# Patient Record
Sex: FEMALE | Race: WHITE | Employment: OTHER | URBAN - METROPOLITAN AREA
[De-identification: names, ages, dates, MRNs, and addresses within clinical notes are randomized per-mention and may not be internally consistent; named-entity substitution may affect disease eponyms.]

---

## 2018-02-09 ENCOUNTER — HOSPITAL ENCOUNTER (OUTPATIENT)
Dept: LAB | Age: 63
Discharge: HOME | End: 2018-02-09

## 2018-06-07 ENCOUNTER — OFFICE VISIT (OUTPATIENT)
Dept: INTERNAL MEDICINE CLINIC | Age: 63
End: 2018-06-07

## 2018-06-07 VITALS
OXYGEN SATURATION: 98 % | HEART RATE: 79 BPM | WEIGHT: 223 LBS | SYSTOLIC BLOOD PRESSURE: 158 MMHG | DIASTOLIC BLOOD PRESSURE: 88 MMHG | TEMPERATURE: 98.9 F | HEIGHT: 66 IN | BODY MASS INDEX: 35.84 KG/M2

## 2018-06-07 DIAGNOSIS — I10 ESSENTIAL HYPERTENSION: ICD-10-CM

## 2018-06-07 DIAGNOSIS — J01.00 ACUTE MAXILLARY SINUSITIS, RECURRENCE NOT SPECIFIED: Primary | ICD-10-CM

## 2018-06-07 PROBLEM — C50.411 MALIGNANT NEOPLASM OF UPPER-OUTER QUADRANT OF RIGHT FEMALE BREAST (HCC): Status: ACTIVE | Noted: 2018-06-07

## 2018-06-07 RX ORDER — BUMETANIDE 2 MG/1
2 TABLET ORAL DAILY
COMMUNITY

## 2018-06-07 RX ORDER — ATORVASTATIN CALCIUM 10 MG/1
TABLET, FILM COATED ORAL DAILY
COMMUNITY

## 2018-06-07 RX ORDER — ASPIRIN 81 MG/1
TABLET ORAL DAILY
COMMUNITY

## 2018-06-07 RX ORDER — SPIRONOLACTONE 25 MG/1
TABLET ORAL DAILY
COMMUNITY

## 2018-06-07 RX ORDER — CLONIDINE HYDROCHLORIDE 0.1 MG/1
TABLET ORAL 2 TIMES DAILY
COMMUNITY

## 2018-06-07 RX ORDER — AMOXICILLIN AND CLAVULANATE POTASSIUM 875; 125 MG/1; MG/1
1 TABLET, FILM COATED ORAL 2 TIMES DAILY
Qty: 20 TAB | Refills: 0 | Status: SHIPPED | OUTPATIENT
Start: 2018-06-07 | End: 2018-06-17

## 2018-06-07 RX ORDER — DOXAZOSIN 8 MG/1
8 TABLET ORAL DAILY
COMMUNITY

## 2018-06-07 RX ORDER — VALSARTAN 320 MG/1
TABLET ORAL DAILY
COMMUNITY
End: 2019-03-27 | Stop reason: ALTCHOICE

## 2018-06-07 RX ORDER — NEBIVOLOL 20 MG/1
TABLET ORAL DAILY
COMMUNITY

## 2018-06-07 NOTE — PROGRESS NOTES
Olena Gregory is a 70 y.o. female presenting for Cold Symptoms  . 1. Have you been to the ER, urgent care clinic since your last visit? Hospitalized since your last visit? No    2. Have you seen or consulted any other health care providers outside of the [facility] since your last visit? Include any pap smears or colon screening. No    Fall Risk Assessment, last 12 mths 6/7/2018   Able to walk? Yes   Fall in past 12 months? No         Abuse Screening Questionnaire 6/7/2018   Do you ever feel afraid of your partner? N   Are you in a relationship with someone who physically or mentally threatens you? N   Is it safe for you to go home? Y       PHQ over the last two weeks 6/7/2018   Little interest or pleasure in doing things Not at all   Feeling down, depressed or hopeless Not at all   Total Score PHQ 2 0       There are no discontinued medications.

## 2018-06-07 NOTE — MR AVS SNAPSHOT
[address] [place] [address] 70 [address] [address] [phone] Patient: Laureen Tiwari MRN: [id number] OBC:5/6/[birth year]63 Visit Information Date & Time Provider Department Dept. Phone Encounter #  
 6/7/2018  1:10 PM Ilene Calderón MD [facility] [id number] Follow-up Instructions Return if symptoms worsen or fail to improve. Upcoming Health Maintenance Date Due Hepatitis C Screening 1955 DTaP/Tdap/Td series (1 - Tdap) 6/5/1974 PAP AKA CERVICAL CYTOLOGY 6/5/1974 BREAST CANCER SCRN MAMMOGRAM 6/5/2003 FOBT Q 1 YEAR AGE 50-75 6/5/2003 ZOSTER VACCINE AGE 60> 4/5/2013 GLAUCOMA SCREENING Q2Y 6/5/2018 Bone Densitometry (Dexa) Screening 6/5/2018 Pneumococcal 65+ High/Highest Risk (1 of 2 - PCV13) 6/5/2018 Influenza Age 5 to Adult 8/1/2018 Allergies as of 6/7/2018  Review Complete On: 6/7/2018 By: Ilene Calderón MD  
  
 Severity Noted Reaction Type Reactions Tetracycline Medium 06/07/2018    Swelling Current Immunizations  Never Reviewed No immunizations on file. Not reviewed this visit You Were Diagnosed With   
  
 Codes Comments Acute maxillary sinusitis, recurrence not specified    -  Primary ICD-10-CM: J01.00 ICD-9-CM: 461.0 Essential hypertension     ICD-10-CM: I10 
ICD-9-CM: 401.9 Vitals BP Pulse Temp Height(growth percentile) Weight(growth percentile) SpO2  
 158/88 (BP 1 Location: Right arm, BP Patient Position: Sitting) 79 98.9 °F (37.2 °C) 5' 6\" (1.676 m) 223 lb (101.2 kg) 98% BMI Smoking Status 35.99 kg/m2 Never Smoker BMI and BSA Data Body Mass Index Body Surface Area 35.99 kg/m 2 2.17 m 2 Preferred Pharmacy Pharmacy Name Phone [id number] [address] [phone] Your Updated Medication List  
  
   
This list is accurate as of 6/7/18  1:46 PM.  Always use your most recent med list.  
  
  
  
  
 amoxicillin-clavulanate 875-125 mg per tablet Commonly known as:  AUGMENTIN Take 1 Tab by mouth two (2) times a day for 10 days. aspirin delayed-release 81 mg tablet Take  by mouth daily. bumetanide 2 mg tablet Commonly known as:  Sharlee Dhaliwal Take 2 mg by mouth daily. BYSTOLIC 20 mg tablet Generic drug:  nebivolol Take  by mouth daily. cloNIDine HCl 0.1 mg tablet Commonly known as:  CATAPRES Take  by mouth two (2) times a day. doxazosin 8 mg tablet Commonly known as:  CARDURA Take 8 mg by mouth daily. LIPITOR 10 mg tablet Generic drug:  atorvastatin Take  by mouth daily. spironolactone 25 mg tablet Commonly known as:  ALDACTONE Take  by mouth daily. valsartan 320 mg tablet Commonly known as:  DIOVAN Take  by mouth daily. Prescriptions Sent to Pharmacy Refills  
 amoxicillin-clavulanate (AUGMENTIN) 875-125 mg per tablet 0 Sig: Take 1 Tab by mouth two (2) times a day for 10 days. Class: Normal  
 Pharmacy: [address] #: [phone] Route: Oral  
  
Follow-up Instructions Return if symptoms worsen or fail to improve. Introducing [facility]! Mejia Blackmon introduces [employer] patient portal. Now you can access parts of your medical record, email your doctor's office, and request medication refills online. 1. In your internet browser, go to [email or web address] 2. Click on the First Time User? Click Here link in the Sign In box. You will see the New Member Sign Up page. 3. Enter your [employer] Access Code exactly as it appears below. You will not need to use this code after youve completed the sign-up process.  If you do not sign up before the expiration date, you must request a new code. · [employer] Access Code: [email or web address] Expires: 9/5/2018  1:22 PM 
 
4. Enter the last four digits of your Social Security Number (xxxx) and Date of Birth (mm/dd/yyyy) as indicated and click Submit. You will be taken to the next sign-up page. 5. Create a [employer] ID. This will be your [employer] login ID and cannot be changed, so think of one that is secure and easy to remember. 6. Create a [employer] password. You can change your password at any time. 7. Enter your Password Reset Question and Answer. This can be used at a later time if you forget your password. 8. Enter your e-mail address. You will receive e-mail notification when new information is available in [address] 9. Click Sign Up. You can now view and download portions of your medical record. 10. Click the Download Summary menu link to download a portable copy of your medical information. If you have questions, please visit the Frequently Asked Questions section of the [employer] website. Remember, [employer] is NOT to be used for urgent needs. For medical emergencies, dial 911. Now available from your iPhone and Android! Please provide this summary of care documentation to your next provider. If you have any questions after today's visit, please call [phone].

## 2018-06-07 NOTE — PROGRESS NOTES
This note will not be viewable in [address] Stacey Johnson is a 70 y.o. female and presents with Cold Symptoms  . Subjective:  Mrs. Bernardine Libman presents to the office today with 10 days worth of an upper respiratory infection with the development of sinus congestion, maxillary discomfort and purulent sinus drainage. She has had postnasal drainage which is been causing a cough productive of yellowish phlegm. She has had some feverishness without chills she denies neck stiffness or rash. She has been taking over-the-counter cold medication and believes this may be causing her blood pressure to be elevated    Patient Active Problem List   Diagnosis Code    Essential hypertension I10    Malignant neoplasm of upper-outer quadrant of right female breast ([facility] [address] C50.411     History reviewed. No pertinent surgical history. Allergies   Allergen Reactions    Tetracycline Swelling     Current Outpatient Prescriptions   Medication Sig Dispense Refill    cloNIDine HCl (CATAPRES) 0.1 mg tablet Take  by mouth two (2) times a day.  nebivolol (BYSTOLIC) 20 mg tablet Take  by mouth daily.  doxazosin (CARDURA) 8 mg tablet Take 8 mg by mouth daily.  valsartan (DIOVAN) 320 mg tablet Take  by mouth daily.  bumetanide (BUMEX) 2 mg tablet Take 2 mg by mouth daily.  spironolactone (ALDACTONE) 25 mg tablet Take  by mouth daily.  aspirin delayed-release 81 mg tablet Take  by mouth daily.  atorvastatin (LIPITOR) 10 mg tablet Take  by mouth daily.  amoxicillin-clavulanate (AUGMENTIN) 875-125 mg per tablet Take 1 Tab by mouth two (2) times a day for 10 days.  20 Tab 0     Social History     Social History    Marital status:      Spouse name: N/A    Number of children: N/A    Years of education: N/A     Social History Main Topics    Smoking status: Never Smoker    Smokeless tobacco: Never Used    Alcohol use Yes      Comment: rare    Drug use: No    Sexual activity: Not Asked     Other Topics Concern    None     Social History Narrative    None     Family History   Problem Relation Age of Onset    Pneumonia Father     Lung Disease Father        Review of Systems  Constitutional: negative for fevers, chills, anorexia and weight loss  Eyes:   negative for visual disturbance and irritation  ENT:   Positive for sinus congestion, maxillary discomfort, purulent psotnasal draingage and throat irritation  Respiratory:  negative for cough, hemoptysis, dyspnea,wheezing  CV:   negative for chest pain, palpitations, lower extremity edema  GI:   negative for nausea, vomiting, diarrhea, abdominal pain,melena  Integumentary: negative for rash and pruritus  Neurological:  negative for headaches, dizziness, vertigo, memory problems and gait       Objective:  Visit Vitals    /88 (BP 1 Location: Right arm, BP Patient Position: Sitting)    Pulse 79    Temp 98.9 °F (37.2 °C)    Ht 5' 6\" (1.676 m)    Wt 223 lb (101.2 kg)    SpO2 98%    BMI 35.99 kg/m2     Body mass index is 35.99 kg/(m^2). Physical Exam:   General appearance - alert, well appearing, and in no distress  Mental status - alert, oriented to person, place, and time  EYE-ODELL, EOMI, sclera clear. No lid swelling or purulent drainage  ENT- TM's clear without A/F level. Pharynx slightly erythematous with drainage noted  Nose - normal and patent, no erythema,  Neck - supple, no significant adenopathy   Chest - clear to auscultation, no wheezes, rales or rhonchi, symmetric air entry   Heart - normal rate, regular rhythm, normal S1, S2, no murmurs, rubs, clicks or gallops   Skin-No rash appreciated  Neuro -alert, oriented, normal speech, no focal findings. Assessment/Plan:  Diagnoses and all orders for this visit:    Acute maxillary sinusitis, recurrence not specified  -     amoxicillin-clavulanate (AUGMENTIN) 875-125 mg per tablet; Take 1 Tab by mouth two (2) times a day for 10 days. , Normal, Disp-20 Tab, R-0    Essential hypertension        Other Instructions:  Mucinex as directed    Delsym as needed for cough    Blood pressure probably elevated as a result of cold medication she has been taking    Body mass index is 36 and dietary counseling along with printed patient education is given    Increase po fluids    Follow-up Disposition:  Return if symptoms worsen or fail to improve. I have reviewed with the patient details of the assessment and plan and all questions were answered. Relevent patient education was performed. An After Visit Summary was printed and given to the patient.     Karen Diaz MD

## 2018-10-23 ENCOUNTER — CLINICAL SUPPORT (OUTPATIENT)
Dept: INTERNAL MEDICINE CLINIC | Age: 63
End: 2018-10-23

## 2018-10-23 VITALS
HEART RATE: 78 BPM | TEMPERATURE: 98.2 F | RESPIRATION RATE: 16 BRPM | DIASTOLIC BLOOD PRESSURE: 92 MMHG | SYSTOLIC BLOOD PRESSURE: 160 MMHG | OXYGEN SATURATION: 98 %

## 2018-10-23 DIAGNOSIS — Z23 ENCOUNTER FOR IMMUNIZATION: Primary | ICD-10-CM

## 2019-03-27 ENCOUNTER — OFFICE VISIT (OUTPATIENT)
Dept: INTERNAL MEDICINE CLINIC | Age: 64
End: 2019-03-27

## 2019-03-27 ENCOUNTER — TELEPHONE (OUTPATIENT)
Dept: INTERNAL MEDICINE CLINIC | Age: 64
End: 2019-03-27

## 2019-03-27 VITALS
HEART RATE: 71 BPM | HEIGHT: 66 IN | WEIGHT: 218 LBS | DIASTOLIC BLOOD PRESSURE: 80 MMHG | SYSTOLIC BLOOD PRESSURE: 130 MMHG | RESPIRATION RATE: 20 BRPM | BODY MASS INDEX: 35.03 KG/M2 | TEMPERATURE: 98.5 F | OXYGEN SATURATION: 99 %

## 2019-03-27 DIAGNOSIS — M79.671 RIGHT FOOT PAIN: Primary | ICD-10-CM

## 2019-03-27 RX ORDER — OLMESARTAN MEDOXOMIL 40 MG/1
TABLET ORAL
COMMUNITY
Start: 2019-03-04

## 2019-03-27 RX ORDER — DICLOFENAC SODIUM 75 MG/1
75 TABLET, DELAYED RELEASE ORAL 2 TIMES DAILY
Qty: 60 TAB | Refills: 0 | Status: SHIPPED | OUTPATIENT
Start: 2019-03-27

## 2019-03-27 NOTE — PROGRESS NOTES
This note will not be viewable in [address] Subjective:  
 
Mrs. Harris Licona presents to the office today for the evaluation of an injury which occurred to her right foot and ankle 1 week ago. The patient states that she thought that she had injured it while walking and may have turned her ankle. A day or 2 later she went to a house with them on the even front sidewalk and turned again. She noted severe pain over the last several days mostly along the lateral aspect of the right ankle and foot region. There has been some swelling but no ecchymosis. She has been able to bear weight. She noted that the pain was more severe yesterday and she started taking Aleve and is noted some improvement in the discomfort. She denies any pain or swelling extending up her leg. Past Medical History:  
Diagnosis Date  Essential hypertension 6/7/2018  Malignant neoplasm of upper-outer quadrant of right female breast ([facility] [address] 6/7/2018 History reviewed. No pertinent surgical history. Allergies Allergen Reactions  Tetracycline Swelling Current Outpatient Medications Medication Sig Dispense Refill  olmesartan (BENICAR) 40 mg tablet  cloNIDine HCl (CATAPRES) 0.1 mg tablet Take  by mouth two (2) times a day.  nebivolol (BYSTOLIC) 20 mg tablet Take  by mouth daily.  doxazosin (CARDURA) 8 mg tablet Take 8 mg by mouth daily.  bumetanide (BUMEX) 2 mg tablet Take 2 mg by mouth daily.  spironolactone (ALDACTONE) 25 mg tablet Take  by mouth daily.  aspirin delayed-release 81 mg tablet Take  by mouth daily.  atorvastatin (LIPITOR) 10 mg tablet Take  by mouth daily. Social History Socioeconomic History  Marital status:  Spouse name: Not on file  Number of children: Not on file  Years of education: Not on file  Highest education level: Not on file Tobacco Use  Smoking status: Never Smoker  Smokeless tobacco: Never Used Substance and Sexual Activity  Alcohol use: Yes Comment: rare  Drug use: No  
 
Family History Problem Relation Age of Onset  Pneumonia Father  Lung Disease Father Review of Systems: 
GEN: no weight loss, weight gain, fatigue or night sweats CV: no PND, orthopnea, or palpitations Resp: no dyspnea on exertion, no cough Abd: no nausea, vomiting or diarrhea EXT: Positive for right foot/ankle pain and swelling Endocrine: no hair loss, excessive thirst or polyuria Neurological ROS: no TIA or stroke symptoms ROS otherwise negative Objective:  
 
Visit Vitals /80 (BP 1 Location: Right arm, BP Patient Position: Sitting) Pulse 71 Temp 98.5 °F (36.9 °C) (Oral) Resp 20 Ht 5' 6\" (1.676 m) Wt 218 lb (98.9 kg) SpO2 99% BMI 35.19 kg/m² Body mass index is 35.19 kg/m². General:   alert, cooperative and no distress Extremities:  Examination of the right foot reveals no obvious swelling, redness or warmth. Examination of the right ankle reveals a full range of motion however there is swelling along the lateral ankle joint. She has pain with palpation over the tendons in that area. There is no pain along the bottom of the foot or along the heel and no pain in the Achilles tendon region. Her PT/DP pulses were intact Neuro: ..alert, oriented x3,speech normal in context and clarity, cranial nerves II-XII intact,motor strength: full proximally and distally,gait: normal 
reflexes: full and symmetric Physical exam otherwise negative Assessment/Plan:  
 
Diagnoses and all orders for this visit: 
 
Right foot pain -     XR ANKLE RT MIN 3 V; Future Other instructions:  
Review of the right ankle x-rays fails to reveal any fracture. I suspect she has an inversion sprain of the right ankle. Start warm soaks with range of motion exercises Have recommended a supportive shoe to be worn at all times Voltaren has been prescribed for pain and inflammation Follow-up if there is any worsening Ilene Calderón MD

## 2019-03-27 NOTE — PROGRESS NOTES
Gilberto Mcclendon is a 70 y.o. female presenting for Foot Injury (R) Aldenguerline Kirby 1. Have you been to the ER, urgent care clinic since your last visit? Hospitalized since your last visit? No 
 
2. Have you seen or consulted any other health care providers outside of the [address] [place] [address] since your last visit? Include any pap smears or colon screening. Cardiologist. 
 
Fall Risk Assessment, last 12 mths 3/27/2019 Able to walk? Yes Fall in past 12 months? No  
 
 
 
Abuse Screening Questionnaire 3/27/2019 Do you ever feel afraid of your partner? Genaro Bile Are you in a relationship with someone who physically or mentally threatens you? Genaro Bile Is it safe for you to go home? Y  
 
 
3 most recent PHQ Screens 3/27/2019 Little interest or pleasure in doing things Not at all Feeling down, depressed, irritable, or hopeless Not at all Total Score PHQ 2 0 There are no discontinued medications.

## 2019-03-27 NOTE — TELEPHONE ENCOUNTER
Patient is calling, she is requesting a call back from Geraldo López in regards to the appointment she has today at 1pm. She would like to discuss her foot and wants to know if she still needs this appointment.     Best call back # for patient: [phone]

## 2019-03-27 NOTE — PATIENT INSTRUCTIONS
Ankle Sprain: Rehab Exercises Your Care Instructions Here are some examples of typical rehabilitation exercises for your condition. Start each exercise slowly. Ease off the exercise if you start to have pain. Your doctor or physical therapist will tell you when you can start these exercises and which ones will work best for you. How to do the exercises \"Alphabet\" exercise 1. Trace the alphabet with your toe. This helps your ankle move in all directions. Side-to-side knee swing exercise 1. Sit in a chair with your foot flat on the floor. 2. Slowly move your knee from side to side. Keep your foot pressed flat. 3. Continue this exercise for 2 to 3 minutes. Towel curl 1. While sitting, place your foot on a towel on the floor. Scrunch the towel toward you with your toes. 2. Then use your toes to push the towel away from you. 3. To make this exercise more challenging you can put something on the other end of the towel. A can of soup is about the right weight for this. Towel stretch 1. Sit with your legs extended and knees straight. 2. Place a towel around your foot just under the toes. 3. Hold each end of the towel in each hand, with your hands above your knees. 4. Pull back with the towel so that your foot stretches toward you. 5. Hold the position for at least 15 to 30 seconds. 6. Repeat 2 to 4 times a session. Do up to 5 sessions a day. Ankle eversion exercise 1. Start by sitting with your foot flat on the floor. Push your foot outward against a wall or a piece of furniture that doesn't move. Hold for about 6 seconds, and relax. Repeat 8 to 12 times. 2. After you feel comfortable with this, try using rubber tubing looped around the outside of your feet for resistance. Push your foot out to the side against the tubing, and then count to 10 as you slowly bring your foot back to the middle. Repeat 8 to 12 times. Isometric opposition exercises 1. While sitting, put your feet together flat on the floor. 2. Press your injured foot inward against your other foot. Hold for about 6 seconds, and relax. Repeat 8 to 12 times. 3. Then place the heel of your other foot on top of the injured one. Push down with the top heel while trying to push up with your injured foot. Hold for about 6 seconds, and relax. Repeat 8 to 12 times. Resisted ankle inversion 1. Sit on the floor with your good leg crossed over your other leg. 2. Hold both ends of an exercise band and loop the band around the inside of your affected foot. Then press your other foot against the band. 3. Keeping your legs crossed, slowly push your affected foot against the band so that foot moves away from your other foot. Then slowly relax. 4. Repeat 8 to 12 times. Resisted ankle eversion 1. Sit on the floor with your legs straight. 2. Hold both ends of an exercise band and loop the band around the outside of your affected foot. Then press your other foot against the band. 3. Keeping your leg straight, slowly push your affected foot outward against the band and away from your other foot without letting your leg rotate. Then slowly relax. 4. Repeat 8 to 12 times. Resisted ankle dorsiflexion 1. Tie the ends of an exercise band together to form a loop. Attach one end of the loop to a secure object or shut a door on it to hold it in place. (Or you can have someone hold one end of the loop to provide resistance.) 2. While sitting on the floor or in a chair, loop the other end of the band over the top of your affected foot. 3. Keeping your knee and leg straight, slowly flex your foot to pull back on the exercise band, and then slowly relax. 4. Repeat 8 to 12 times. Single-leg balance 1. Stand on a flat surface with your arms stretched out to your sides like you are making the letter \"T. \" Then lift your good leg off the floor, bending it at the knee. If you are not steady on your feet, use one hand to hold on to a chair, counter, or wall. 2. Standing on the leg with your affected ankle, keep that knee straight. Try to balance on that leg for up to 30 seconds. Then rest for up to 10 seconds. 3. Repeat 6 to 8 times. 4. When you can balance on your affected leg for 30 seconds with your eyes open, try to balance on it with your eyes closed. 5. When you can do this exercise with your eyes closed for 30 seconds and with ease and no pain, try standing on a pillow or piece of foam, and repeat steps 1 through 4. Follow-up care is a key part of your treatment and safety. Be sure to make and go to all appointments, and call your doctor if you are having problems. It's also a good idea to know your test results and keep a list of the medicines you take. Where can you learn more? Go to http://laura-elle.info/. Mally Cruz in the search box to learn more about \"Ankle Sprain: Rehab Exercises. \" Current as of: September 20, 2018 Content Version: 11.9 © 8369-9277 [employer]. Care instructions adapted under license by [employer] (which disclaims liability or warranty for this information). If you have questions about a medical condition or this instruction, always ask your healthcare professional. [place] [address] any warranty or liability for your use of this information.

## 2021-03-26 NOTE — PATIENT INSTRUCTIONS
High Blood Pressure: Care Instructions  Your Care Instructions    If your blood pressure is usually above 140/90, you have high blood pressure, or hypertension. That means the top number is 140 or higher or the bottom number is 90 or higher, or both. Despite what a lot of people think, high blood pressure usually doesn't cause headaches or make you feel dizzy or lightheaded. It usually has no symptoms. But it does increase your risk for heart attack, stroke, and kidney or eye damage. The higher your blood pressure, the more your risk increases. Your doctor will give you a goal for your blood pressure. Your goal will be based on your health and your age. An example of a goal is to keep your blood pressure below 140/90. Lifestyle changes, such as eating healthy and being active, are always important to help lower blood pressure. You might also take medicine to reach your blood pressure goal.  Follow-up care is a key part of your treatment and safety. Be sure to make and go to all appointments, and call your doctor if you are having problems. It's also a good idea to know your test results and keep a list of the medicines you take. How can you care for yourself at home? Medical treatment  · If you stop taking your medicine, your blood pressure will go back up. You may take one or more types of medicine to lower your blood pressure. Be safe with medicines. Take your medicine exactly as prescribed. Call your doctor if you think you are having a problem with your medicine. · Talk to your doctor before you start taking aspirin every day. Aspirin can help certain people lower their risk of a heart attack or stroke. But taking aspirin isn't right for everyone, because it can cause serious bleeding. · See your doctor regularly. You may need to see the doctor more often at first or until your blood pressure comes down.   · If you are taking blood pressure medicine, talk to your doctor before you take decongestants or anti-inflammatory medicine, such as ibuprofen. Some of these medicines can raise blood pressure. · Learn how to check your blood pressure at home. Lifestyle changes  · Stay at a healthy weight. This is especially important if you put on weight around the waist. Losing even 10 pounds can help you lower your blood pressure. · If your doctor recommends it, get more exercise. Walking is a good choice. Bit by bit, increase the amount you walk every day. Try for at least 30 minutes on most days of the week. You also may want to swim, bike, or do other activities. · Avoid or limit alcohol. Talk to your doctor about whether you can drink any alcohol. · Try to limit how much sodium you eat to less than 2,300 milligrams (mg) a day. Your doctor may ask you to try to eat less than 1,500 mg a day. · Eat plenty of fruits (such as bananas and oranges), vegetables, legumes, whole grains, and low-fat dairy products. · Lower the amount of saturated fat in your diet. Saturated fat is found in animal products such as milk, cheese, and meat. Limiting these foods may help you lose weight and also lower your risk for heart disease. · Do not smoke. Smoking increases your risk for heart attack and stroke. If you need help quitting, talk to your doctor about stop-smoking programs and medicines. These can increase your chances of quitting for good. When should you call for help? Call 911 anytime you think you may need emergency care. This may mean having symptoms that suggest that your blood pressure is causing a serious heart or blood vessel problem. Your blood pressure may be over 180/110. ? For example, call 911 if:  ? · You have symptoms of a heart attack. These may include:  ¨ Chest pain or pressure, or a strange feeling in the chest.  ¨ Sweating. ¨ Shortness of breath. ¨ Nausea or vomiting.   ¨ Pain, pressure, or a strange feeling in the back, neck, jaw, or upper belly or in one or both shoulders or arms.  ¨ Lightheadedness or sudden weakness. ¨ A fast or irregular heartbeat. ? · You have symptoms of a stroke. These may include:  ¨ Sudden numbness, tingling, weakness, or loss of movement in your face, arm, or leg, especially on only one side of your body. ¨ Sudden vision changes. ¨ Sudden trouble speaking. ¨ Sudden confusion or trouble understanding simple statements. ¨ Sudden problems with walking or balance. ¨ A sudden, severe headache that is different from past headaches. ? · You have severe back or belly pain. ?Do not wait until your blood pressure comes down on its own. Get help right away. ?Call your doctor now or seek immediate care if:  ? · Your blood pressure is much higher than normal (such as 180/110 or higher), but you don't have symptoms. ? · You think high blood pressure is causing symptoms, such as:  ¨ Severe headache. ¨ Blurry vision. ? Watch closely for changes in your health, and be sure to contact your doctor if:  ? · Your blood pressure measures 140/90 or higher at least 2 times. That means the top number is 140 or higher or the bottom number is 90 or higher, or both. ? · You think you may be having side effects from your blood pressure medicine. ? · Your blood pressure is usually normal, but it goes above normal at least 2 times. Where can you learn more? Go to http://laura-elle.info/. Enter B038 in the search box to learn more about \"High Blood Pressure: Care Instructions. \"  Current as of: September 21, 2016  Content Version: 11.4  © 7383-9015 [employer]. Care instructions adapted under license by [employer] (which disclaims liability or warranty for this information). If you have questions about a medical condition or this instruction, always ask your healthcare professional. [place] [address] any warranty or liability for your use of this information.        Learning About Cutting Calories  How do calories affect your weight? Food gives your body energy. Energy from the food you eat is measured in calories. This energy keeps your heart beating, your brain active, and your muscles working. Your body needs a certain number of calories each day. After your body uses the calories it needs, it stores extra calories as fat. To lose weight safely, you have to eat fewer calories while eating in a healthy way. How many calories do you need each day? The more active you are, the more calories you need. When you are less active, you need fewer calories. How many calories you need each day also depends on several things, including your age and whether you are male or female. Here are some general guidelines for adults:  · Less active women and older adults need 1,600 to 2,000 calories each day. · Active women and less active men need 2,000 to 2,400 calories each day. · Active men need 2,400 to 3,000 calories each day. How can you cut calories and eat healthy meals? Whole grains, vegetables and fruits, and dried beans are good lower-calorie foods. They give you lots of nutrients and fiber. And they fill you up. Sweets, energy drinks, and soda pop are high in calories. They give you few nutrients and no fiber. Try to limit soda pop, fruit juice, and energy drinks. Drink water instead. Some fats can be part of a healthy diet. But cutting back on fats from highly processed foods like fast foods and many snack foods is a good way to lower the calories in your diet. Also, use smaller amounts of fats like butter, margarine, salad dressing, and mayonnaise. Add fresh garlic, lemon, or herbs to your meals to add flavor without adding fat. Meats and dairy products can be a big source of hidden fats. Try to choose lean or low-fat versions of these products. Fat-free cookies, candies, chips, and frozen treats can still be high in sugar and calories. Some fat-free foods have more calories than regular ones.  Eat fat-free treats in moderation, as you would other foods. If your favorite foods are high in fat, salt, sugar, or calories, limit how often you eat them. Eat smaller servings, or look for healthy substitutes. Fill up on fruits, vegetables, and whole grains. Eating at home  · Use meat as a side dish instead of as the main part of your meal.  · Try main dishes that use whole wheat pasta, brown rice, dried beans, or vegetables. · Find ways to cook with little or no fat, such as broiling, steaming, or grilling. · Use cooking spray instead of oil. If you use oil, use a monounsaturated oil, such as canola or olive oil. · Trim fat from meats before you cook them. · Drain off fat after you brown the meat or while you roast it. · Chill soups and stews after you cook them. Then skim the fat off the top after it hardens. Eating out  · Order foods that are broiled or poached rather than fried or breaded. · Cut back on the amount of butter or margarine that you use on bread. · Order sauces, gravies, and salad dressings on the side, and use only a little. · When you order pasta, choose tomato sauce rather than cream sauce. · Ask for salsa with your baked potato instead of sour cream, butter, cheese, or constantino. · Order meals in a small size instead of upgrading to a large. · Share an entree, or take part of your food home to eat as another meal.  · Share appetizers and desserts. Where can you learn more? Go to http://laura-elle.info/. Enter 99 [id number] in the search box to learn more about \"Learning About Cutting Calories. \"  Current as of: May 12, 2017  Content Version: 11.4  © 0238-8122 [employer]. Care instructions adapted under license by [employer] (which disclaims liability or warranty for this information).  If you have questions about a medical condition or this instruction, always ask your healthcare professional. [address]  any warranty or liability for your use of this information. Chonodrocutaneous Helical Advancement Flap Text: The defect edges were debeveled with a #15 scalpel blade.  Given the location of the defect and the proximity to free margins a chondrocutaneous helical advancement flap was deemed most appropriate.  Using a sterile surgical marker, the appropriate advancement flap was drawn incorporating the defect and placing the expected incisions within the relaxed skin tension lines where possible.    The area thus outlined was incised deep to adipose tissue with a #15 scalpel blade.  The skin margins were undermined to an appropriate distance in all directions utilizing iris scissors.

## 2021-07-06 ENCOUNTER — TELEPHONE (OUTPATIENT)
Dept: INTERNAL MEDICINE CLINIC | Age: 66
End: 2021-07-06

## 2021-07-06 NOTE — TELEPHONE ENCOUNTER
Patient states she went to the [facility] clinic last Wednesday. She was watering flowers and a needle went into her finger. They prescribed Keflex and she has 4 more days of it but they told her to call after 3 days if it not better. It is still red and swollen. She has another appt at 11am so she is unavailable then.     Nando Regalado [phone]

## 2021-07-14 ENCOUNTER — HOSPITAL ENCOUNTER (INPATIENT)
Age: 66
LOS: 1 days | Discharge: HOME | DRG: 513 | End: 2021-07-14
Attending: ORTHOPAEDIC SURGERY | Admitting: ORTHOPAEDIC SURGERY
Payer: MEDICARE

## 2021-07-14 VITALS
RESPIRATION RATE: 16 BRPM | HEIGHT: 66 IN | SYSTOLIC BLOOD PRESSURE: 95 MMHG | DIASTOLIC BLOOD PRESSURE: 53 MMHG | TEMPERATURE: 98 F | OXYGEN SATURATION: 98 % | BODY MASS INDEX: 33.75 KG/M2 | HEART RATE: 67 BPM | WEIGHT: 210 LBS

## 2021-07-14 DIAGNOSIS — M10.041 ACUTE IDIOPATHIC GOUT OF RIGHT HAND: Primary | ICD-10-CM

## 2021-07-14 PROBLEM — M00.9 SEPTIC ARTHRITIS OF INTERPHALANGEAL JOINT OF FINGER, RIGHT (HCC): Status: ACTIVE | Noted: 2021-07-14

## 2021-07-14 LAB — HGB BLD-MCNC: 12 G/DL (ref 11.5–16)

## 2021-07-14 PROCEDURE — 87075 CULTR BACTERIA EXCEPT BLOOD: CPT

## 2021-07-14 PROCEDURE — 77030040922 HC BLNKT HYPOTHRM STRY -A

## 2021-07-14 PROCEDURE — 76210000050 HC AMBSU PH II REC 0.5 TO 1 HR: Performed by: ORTHOPAEDIC SURGERY

## 2021-07-14 PROCEDURE — 85018 HEMOGLOBIN: CPT

## 2021-07-14 PROCEDURE — 74011250636 HC RX REV CODE- 250/636: Performed by: ORTHOPAEDIC SURGERY

## 2021-07-14 PROCEDURE — 77030002916 HC SUT ETHLN J&J -A: Performed by: ORTHOPAEDIC SURGERY

## 2021-07-14 PROCEDURE — 88304 TISSUE EXAM BY PATHOLOGIST: CPT

## 2021-07-14 PROCEDURE — 0RBW0ZZ EXCISION OF RIGHT FINGER PHALANGEAL JOINT, OPEN APPROACH: ICD-10-PCS | Performed by: ORTHOPAEDIC SURGERY

## 2021-07-14 PROCEDURE — 76030000002 HC AMB SURG OR TIME FIRST 0.: Performed by: ORTHOPAEDIC SURGERY

## 2021-07-14 PROCEDURE — 87102 FUNGUS ISOLATION CULTURE: CPT

## 2021-07-14 PROCEDURE — 65270000029 HC RM PRIVATE

## 2021-07-14 PROCEDURE — 74011000250 HC RX REV CODE- 250: Performed by: ORTHOPAEDIC SURGERY

## 2021-07-14 PROCEDURE — 87205 SMEAR GRAM STAIN: CPT

## 2021-07-14 PROCEDURE — 74011250636 HC RX REV CODE- 250/636: Performed by: ANESTHESIOLOGY

## 2021-07-14 PROCEDURE — 2709999900 HC NON-CHARGEABLE SUPPLY: Performed by: ORTHOPAEDIC SURGERY

## 2021-07-14 PROCEDURE — 77030040356 HC CORD BPLR FRCP COVD -A: Performed by: ORTHOPAEDIC SURGERY

## 2021-07-14 PROCEDURE — 2709999900 HC NON-CHARGEABLE SUPPLY

## 2021-07-14 PROCEDURE — 87116 MYCOBACTERIA CULTURE: CPT

## 2021-07-14 RX ORDER — HYDROCODONE BITARTRATE AND ACETAMINOPHEN 5; 325 MG/1; MG/1
1 TABLET ORAL
Qty: 15 TABLET | Refills: 0 | Status: SHIPPED | OUTPATIENT
Start: 2021-07-14 | End: 2021-07-17

## 2021-07-14 RX ORDER — LIDOCAINE HYDROCHLORIDE 10 MG/ML
INJECTION INFILTRATION; PERINEURAL AS NEEDED
Status: DISCONTINUED | OUTPATIENT
Start: 2021-07-14 | End: 2021-07-14 | Stop reason: HOSPADM

## 2021-07-14 RX ORDER — SODIUM CHLORIDE, SODIUM LACTATE, POTASSIUM CHLORIDE, CALCIUM CHLORIDE 600; 310; 30; 20 MG/100ML; MG/100ML; MG/100ML; MG/100ML
25 INJECTION, SOLUTION INTRAVENOUS CONTINUOUS
Status: DISCONTINUED | OUTPATIENT
Start: 2021-07-14 | End: 2021-07-14 | Stop reason: HOSPADM

## 2021-07-14 RX ADMIN — CEFAZOLIN 2 G: 1 INJECTION, POWDER, FOR SOLUTION INTRAMUSCULAR; INTRAVENOUS at 11:39

## 2021-07-14 RX ADMIN — SODIUM CHLORIDE, POTASSIUM CHLORIDE, SODIUM LACTATE AND CALCIUM CHLORIDE 25 ML/HR: 600; 310; 30; 20 INJECTION, SOLUTION INTRAVENOUS at 10:20

## 2021-07-14 NOTE — H&P
CARE TEAM:  Patient Care Team:  Pedro Strong MD as PCP - General (Internal Medicine)    ASSESSMENT:  1. Septic arthritis of IP joint of finger, right   2. 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 Finger osteomyelitis, right       There is no problem list on file for this patient. PLAN:  Treatment Plan: She has radiographic findings concerning for chronic septic arthritis with surrounding osteomyelitis. I recommended surgical debridement. This can be done under local anesthesia. She will need Infectious Disease consult and likely long-term IV antibiotics. Orders Placed This Encounter    Surgical Posting Sheet    X-ray finger right 2+ views (23780)    BMI >=25 PATIENT INSTRUCTIONS & EDUCATION     Follow-up: No follow-ups on file. HISTORY OF PRESENT ILLNESS:  Chief Complaint: Injury of the Right Hand    Age: 74 y.o. Sex: female   History of present illness: Charlie Connors is a pleasant 74 y.o. female who presents today for evaluation of her right little finger. She reports an injury to the finger 2 and half weeks ago. She was gripping nail Polish off of her fingers. She had an injury. She was then trimming some bushes. She had a thorn stuck in her nail. She developed an infection. She has been on several rounds of oral antibiotics without complete improvement in her symptoms. She reports progressive deformity of the digit. No fevers or chills. Some pain. Past medical history, past surgical history, medications, allergies, social history, and review of systems have been reviewed by me. No current facility-administered medications on file prior to encounter. Current Outpatient Medications on File Prior to Encounter   Medication Sig Dispense Refill    olmesartan (BENICAR) 40 mg tablet       diclofenac EC (VOLTAREN) 75 mg EC tablet Take 1 Tab by mouth two (2) times a day. 60 Tab 0    cloNIDine HCl (CATAPRES) 0.1 mg tablet Take  by mouth two (2) times a day.       nebivolol (BYSTOLIC) 20 mg tablet Take  by mouth daily.  doxazosin (CARDURA) 8 mg tablet Take 8 mg by mouth daily.  bumetanide (BUMEX) 2 mg tablet Take 2 mg by mouth daily.  spironolactone (ALDACTONE) 25 mg tablet Take  by mouth daily.  aspirin delayed-release 81 mg tablet Take  by mouth daily.  atorvastatin (LIPITOR) 10 mg tablet Take  by mouth daily. Past Medical History:   Diagnosis Date    Essential hypertension 6/7/2018    Malignant neoplasm of upper-outer quadrant of right female breast ([address] 6/7/2018       Allergies   Allergen Reactions    Tetracycline Swelling     Social History     Socioeconomic History    Marital status:      Spouse name: Not on file    Number of children: Not on file    Years of education: Not on file    Highest education level: Not on file   Occupational History    Not on file   Tobacco Use    Smoking status: Never Smoker    Smokeless tobacco: Never Used   Substance and Sexual Activity    Alcohol use: Yes     Comment: rare    Drug use: No    Sexual activity: Not on file   Other Topics Concern    Not on file   Social History Narrative    Not on file     Social Determinants of Health     Financial Resource Strain:     Difficulty of Paying Living Expenses:    Food Insecurity:     Worried About Running Out of Food in the Last Year:     [address] in the Last Year:    Transportation Needs:     Lack of Transportation (Medical):      Lack of Transportation (Non-Medical):    Physical Activity:     Days of Exercise per Week:     Minutes of Exercise per Session:    Stress:     Feeling of Stress :    Social Connections:     Frequency of Communication with Friends and Family:     Frequency of Social Gatherings with Friends and Family:     Attends [employer] Services:     Active Member of Clubs or Organizations:     Attends Club or Organization Meetings:     Marital Status:    Intimate Partner Violence:     Fear of Current or Ex-Partner:  Emotionally Abused:     Physically Abused:     Sexually Abused:          Review of Systems   7/13/2021    Constitutional: Unexplained: Negative  Genitourinary: Frequent Urination: Negative  HEENT: Vision Loss: Negative  Neurological: Memory Loss: Negative  Integumentary: Rash: Negative  Cardiovascular: Palpatations: Negative  Hematologic: Bruises/Bleeds Easily: Negative  Gastrointestinal: Constipation: Negative  Immunological: Seasonal Allergies: Negative  Musculoskeletal: Joint Pain: Negative    OBJECTIVE:  Constitutional: No acute distress. Pleasant and cooperative with exam.  HEENT: Normocephalic. Atraumatic. Eyes are clear  Hearing intact to spoken word   Respiratory: Breathing unlabored. .  Cardiovascular: No marked edema. Psychiatric: Alert. Affect appropriate. Gait: Normal.  Musculoskeletal   Skin is intact. There is a healed puncture wound dorsally over the DIP joint  she has significant mallet deformity of the digit  She has mild erythema dorsally. Some swelling. Able to make a tight fist.    IMAGING / STUDIES:  Order: XR FINGER 2+ VW RIGHT - Indication: Acquired mallet deformity of   right little finger      X-ray finger right 2+ views ([id number])    Result Date: 7/13/2021  Shielding: Not Shielded. Sitting. PA, External Oblique , Lateral .     Impression: I ordered and interpreted two views of the finger. These demonstrate significant destruction of the distal aspect of the middle phalanx in the proximal aspect of the distal phalanx. The DIP joint is severely collapse.  Findings concerning for septic arthritis with surrounding osteomyelitis

## 2021-07-14 NOTE — ROUTINE PROCESS
Patient: Cathy Navarrete MRN: [id number]  SSN: [id number]   YOB: 1955  Age: 74 y.o. Sex: female     Patient is status post Procedure(s):  INCISION AND DRAINAGE RIGHT SMALL FINGER.     Surgeon(s) and Role:     * Kim Oliveira MD - Primary    Local/Dose/Irrigation:  SEE MAR                  Peripheral IV 07/14/21 Left;Posterior Wrist (Active)                           Dressing/Packing:  Incision 07/14/21 Hand Right-Dressing/Treatment: Gauze dressing/dressing sponge;Xeroform (COBAN) (07/14/21 1103)    Splint/Cast:  ]    Other:

## 2021-07-14 NOTE — DISCHARGE INSTRUCTIONS
Dr. Osvaldo Wiggins Postoperative Instructions    1. Please maintain the dressing placed at surgery for 5 days. You may remove it in 5 days. Please keep the dressing clean and dry for 5 days. In 5 days you may get the wound wet and then cover it with a bandaid. If you have any questions or problems with your dressing, please call our office at [phone].  2. Please elevate the operative extremity to the level of the heart to keep swelling at a minimum. You may get up to move around, but when seated please keep the extremity elevated as much as possible. This will decrease swelling and postoperative pain. You may do activities as tolerated. 3. You may ice your arm/hand 5 times a day for 20 minutes at a time. 4. A prescription for pain medication has been sent to your pharmacy. Take it as needed. 5. Signs and symptoms of infection include: redness, increased pain, increased swelling not relieved by elevation, drainage, fever, or chills, If you develop any of these symptoms, call the office at [phone]. 6. Please Follow-up at my office 14 days after surgery or when specified at your pre-operative appointment.

## 2021-07-14 NOTE — OP NOTES
[address]   OPERATIVE REPORT    Name:  David Mao  MR#:  [id number]  :  1955  ACCOUNT #:  [de-identified]  DATE OF SERVICE:  2021      PREOPERATIVE DIAGNOSIS:  Right small finger distal interphalangeal septic arthritis. 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 PROCEDURE PERFORMED:  Debridement, right little finger distal interphalangeal joint. SURGEON:  Henny Brooks MD    ASSISTANT:  None. ANESTHESIA:  Local.    COMPLICATIONS:  None. SPECIMENS REMOVED:  Cultures and pathology, right little finger DIP joint. IMPLANTS:  None. ESTIMATED BLOOD LOSS:  Minimal.    INDICATIONS FOR PROCEDURE:  This is a 39-year-old female who presented to my office yesterday complaining of persistent pain, swelling, and deformity of the DIP joint of the right little finger. She reported an injury to the finger where a needle from a bush was embedded in the digit. She has been treated with several rounds of oral antibiotics without significant improvement in her symptoms. X-rays were concerning for development of septic arthritis and osteomyelitis and therefore she was indicated for debridement of the joint. I have discussed risks, benefits, potential complications, and alternatives of surgery with her and she has given informed consent to proceed. DESCRIPTION OF PROCEDURE:  The patient was identified in the preoperative holding area. Informed consent was obtained. The operative site was marked. A digital block was then performed with 0.5% Marcaine and 1% lidocaine. She was then transported to the OR and placed supine on the operating table. All bony prominences were well-padded. The right upper extremity was sterilely prepped and draped in the usual fashion. Surgical time-out was held. The operative site was confirmed. Preoperative antibiotics were not used. A digital tourniquet was applied.   A Mercedes style incision was made dorsally over the DIP joint. Large skin flaps were raised. Extensive amount of gouty tophi was encountered and was debrided. There were no signs of an infection. The extensor tendon at its insertion was found to be significantly attenuated. The joint was found to be devoid of articular cartilage. There were several large cysts in the distal phalanx as well as in the middle phalanx. These contained extensive gouty material which was debrided. The wound was then thoroughly irrigated. Cultures and pathology were sent. The skin was closed with interrupted 4-0 nylon sutures. Sterile dressings were applied. The tourniquet was released and brisk capillary refill returned to the digits. She was transferred to the PACU in stable condition without complication.       Jessica Rushing MD [id number]  D:  07/14/2021 11:12  T:  07/14/2021 13:01  JOB #:  [id number]

## 2021-07-14 NOTE — BRIEF OP NOTE
Brief Postoperative Note    Patient: Saint Gain  YOB: 1955  MRN: [id number]    Date of Procedure: 7/14/2021     Pre-Op Diagnosis: SEPTIC ARTHRITIS RIGHT SMALL FINGER    Post-Op Diagnosis: gout right little finger      Procedure(s):  INCISION AND DRAINAGE RIGHT SMALL FINGER    Surgeon(s):  Dorian Duke MD    Surgical Assistant: None    Anesthesia: Local     Estimated Blood Loss (mL): Minimal    Complications: None    Specimens:   ID Type Source Tests Collected by Time Destination   1 : RIGHT SMALL FINGER Fresh Finger  Dorian Duke MD 7/14/2021 1052 Pathology   1 : RIGHT SMALL FINGER Tissue Finger AFB CULTURE, CULTURE, FUNGUS Dorian Duke MD 7/14/2021 1055 Microbiology   2 : RIGHT SMALL FINGER #1 Wound Finger AEROBIC/ANAEROBIC CULTURE Dorian Duke MD 7/14/2021 1058 Microbiology   3 : RIGHT SMALL FINGER #2 Wound Finger AEROBIC/ANAEROBIC CULTURE Dorian Duke MD 7/14/2021 1059 Microbiology        Implants: * No implants in log *    Drains: * No LDAs found *    Findings: extensive gouty tophi    Electronically Signed by Kate Guillen MD on 7/14/2021 at 11:08 AM

## 2021-07-14 NOTE — PROGRESS NOTES
Discharge instructions reviewed with patient and her son. Both verbalized understanding and state that they have no questions.

## 2021-07-18 LAB
BACTERIA SPEC CULT: ABNORMAL
BACTERIA SPEC CULT: ABNORMAL
BACTERIA SPEC CULT: NORMAL
GRAM STN SPEC: ABNORMAL
GRAM STN SPEC: ABNORMAL
GRAM STN SPEC: NORMAL
SERVICE CMNT-IMP: ABNORMAL
SERVICE CMNT-IMP: NORMAL

## 2021-07-21 NOTE — PROGRESS NOTES
Physician Progress Note      Vero Arvizu  CSN #:                  [id number]  :                       1955  ADMIT DATE:       2021 8:40 AM  DISCH DATE:        2021 12:05 PM  RESPONDING  PROVIDER #:        Ghanshyam Presley MD          QUERY TEXT:    Patient admitted with gouty arthritis right little finger. Per Op note dated , patient had debridement of interphalangeal joint. 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 Large skin flaps were raised. Extensive amount of gouty tophi was encountered and was debrided. There were no signs of an infection. The extensor tendon at its insertion was found to be significantly attenuated. The joint was found to be devoid of articular cartilage. There were several large cysts in the distal phalanx as well as in the middle phalanx. These contained extensive gouty material which was debrided. Treatment: debridement    Thank you,  Trevon Fernández RN, BSN, [place], [facility]  Clinical Documentation  [phone]  Options provided:  -- Nonexcisional debridement of interphalangeal joint of right little finger  -- Excisional debridement of interphalangeal joint of right little finger  -- Other - I will add my own diagnosis  -- Disagree - Not applicable / Not valid  -- Disagree - Clinically unable to determine / Unknown  -- Refer to Clinical Documentation Reviewer    PROVIDER RESPONSE TEXT:    Excisional debridement of interphalangeal joint of right little finger was performed on 21.     Query created by: Nayeli Kitchen on 2021 1:09 PM      Electronically signed by:  Ghanshyam Presley MD 2021 7:10 AM

## 2021-08-16 LAB
BACTERIA SPEC CULT: NORMAL
SERVICE CMNT-IMP: NORMAL

## 2021-08-27 LAB
ACID FAST STN SPEC: NEGATIVE
MYCOBACTERIUM SPEC QL CULT: NEGATIVE
SPECIMEN PREPARATION: NORMAL
SPECIMEN SOURCE: NORMAL

## 2021-09-21 ENCOUNTER — TELEPHONE (OUTPATIENT)
Dept: INTERNAL MEDICINE CLINIC | Age: 66
End: 2021-09-21

## 2022-03-18 PROBLEM — C50.411 MALIGNANT NEOPLASM OF UPPER-OUTER QUADRANT OF RIGHT FEMALE BREAST (HCC): Status: ACTIVE | Noted: 2018-06-07

## 2022-03-18 PROBLEM — I10 ESSENTIAL HYPERTENSION: Status: ACTIVE | Noted: 2018-06-07

## 2022-03-19 PROBLEM — M00.9 SEPTIC ARTHRITIS OF INTERPHALANGEAL JOINT OF FINGER, RIGHT (HCC): Status: ACTIVE | Noted: 2021-07-14

## 2025-05-12 ENCOUNTER — TRANSCRIBE ORDERS (OUTPATIENT)
Facility: HOSPITAL | Age: 70
End: 2025-05-12

## 2025-05-12 DIAGNOSIS — S92.324A CLOSED NONDISPLACED FRACTURE OF SECOND METATARSAL BONE OF RIGHT FOOT, INITIAL ENCOUNTER: Primary | ICD-10-CM

## 2025-05-16 ENCOUNTER — HOSPITAL ENCOUNTER (OUTPATIENT)
Facility: HOSPITAL | Age: 70
Discharge: HOME | End: 2025-05-19
Payer: MEDICARE

## 2025-05-16 DIAGNOSIS — S92.324A CLOSED NONDISPLACED FRACTURE OF SECOND METATARSAL BONE OF RIGHT FOOT, INITIAL ENCOUNTER: ICD-10-CM

## 2025-05-16 PROCEDURE — 73700 CT LOWER EXTREMITY W/O DYE: CPT

## (undated) DEVICE — BNDG ELAS HK LOOP 6X5YD NS -- MATRIX

## (undated) DEVICE — HANDLE LT SNAP ON ULT DURABLE LENS FOR TRUMPF ALC DISPOSABLE

## (undated) DEVICE — Z DISCONTINUED GLOVE SURG SZ 7.5 L12IN FNGR THK13MIL WHT ISOLEX

## (undated) DEVICE — SYR 10ML LUER LOK 1/5ML GRAD --

## (undated) DEVICE — SOLUTION IRRIG 1000ML 0.9% SOD CHL USP POUR PLAS BTL

## (undated) DEVICE — BIPOLAR FORCEPS CORD: Brand: VALLEYLAB

## (undated) DEVICE — SUT ETHLN 4-0 18IN FS2 BLK --

## (undated) DEVICE — NEEDLE HYPO 25GA L1.5IN BVL ORIENTED ECLIPSE

## (undated) DEVICE — Device

## (undated) DEVICE — SPONGE GZ W4XL4IN COT 12 PLY TYP VII WVN C FLD DSGN

## (undated) DEVICE — DRAPE,REIN 53X77,STERILE: Brand: MEDLINE

## (undated) DEVICE — DRAPE,HAND,STERILE: Brand: MEDLINE

## (undated) DEVICE — BANDAGE COBAN 4 IN COMPR W4INXL5YD FOAM COHESIVE QUIK STK SELF ADH SFT